# Patient Record
(demographics unavailable — no encounter records)

---

## 2024-10-08 NOTE — REVIEW OF SYSTEMS
[Fever] : no fever [Chills] : no chills [Feeling Tired] : not feeling tired [Eye Pain] : no eye pain [Earache] : no earache [Chest Pain] : no chest pain [Palpitations] : no palpitations [Lower Ext Edema] : no extremity edema [Shortness Of Breath] : no shortness of breath [Cough] : no cough [Abdominal Pain] : no abdominal pain [Vomiting] : no vomiting [Constipation] : no constipation [Diarrhea] : no diarrhea [Melena] : no melena [Limb Swelling] : no limb swelling [Itching] : no itching [Dizziness] : no dizziness [Fainting] : no fainting [Anxiety] : no anxiety [Easy Bleeding] : no tendency for easy bleeding [Easy Bruising] : no tendency for easy bruising

## 2024-10-08 NOTE — HISTORY OF PRESENT ILLNESS
[FreeTextEntry1] : RFR: liver mass on CT(?) Referring from cardiology Dr. Domo Blanco  Curt Garcia is a 76y male with HTN, HLD, PVD AI CAD s/p PCI who is referred by cardiology Dr. Blanco for liver mass on CT angio.  - CT Angio coronary 8/9/2023: no liver lesion mentioned. - No other abd imaging  Today he reports feeling well and no physical complaints. No autoimmune diseases.  [Medical Hx] as above [Surgical Hx] CAD s/p PCI [Social Hx] Alcohol: 1/week, 3-4 glasses of soju on each occasion  Tobacco: Quit at age of 50 illicit drug: Denies  Herb and dietary Supplement: Red ginseng extract [FMH of liver disease] None  [Procedures] EGD: No prior Colonoscopy: No prior Pt declined due to concerning for complication

## 2024-10-08 NOTE — REVIEW OF SYSTEMS
[Fever] : no fever [Chills] : no chills [Feeling Tired] : not feeling tired [Eye Pain] : no eye pain [Earache] : no earache [Chest Pain] : no chest pain [Palpitations] : no palpitations [Lower Ext Edema] : no extremity edema [Shortness Of Breath] : no shortness of breath [Cough] : no cough [Abdominal Pain] : no abdominal pain [Vomiting] : no vomiting [Constipation] : no constipation [Diarrhea] : no diarrhea [Melena] : no melena [Limb Swelling] : no limb swelling [Dizziness] : no dizziness [Itching] : no itching [Fainting] : no fainting [Anxiety] : no anxiety [Easy Bleeding] : no tendency for easy bleeding [Easy Bruising] : no tendency for easy bruising

## 2024-10-08 NOTE — PHYSICAL EXAM
[Scleral Icterus] : No Scleral Icterus [Hepatojugular Reflux] : patient did not have a sustained hepatojugular reflux [Abdominal  Ascites] : no ascites [Spider Angioma] : No spider angioma(s) were observed [Splenomegaly] : no splenomegaly [Liver Palpable ___ Finger Breadths Below Costal Margin] : Liver edge was palpable [unfilled] finger breadths below costal margin [Non-Tender] : non-tender [Asterixis] : no asterixis observed [Jaundice] : No jaundice [Palmar Erythema] : no Palmar Erythema [General Appearance - Alert] : alert [General Appearance - In No Acute Distress] : in no acute distress [Outer Ear] : the ears and nose were normal in appearance [Sclera] : the sclera and conjunctiva were normal [Neck Appearance] : the appearance of the neck was normal [] : no respiratory distress [Murmurs] : no murmurs [Heart Sounds] : normal S1 and S2 [Edema] : there was no peripheral edema [Abdomen Tenderness] : non-tender [Abdomen Hernia] : no hernia was discovered [Abnormal Walk] : normal gait [Skin Color & Pigmentation] : normal skin color and pigmentation [Oriented To Time, Place, And Person] : oriented to person, place, and time [Impaired Insight] : insight and judgment were intact [Affect] : the affect was normal

## 2024-10-08 NOTE — ASSESSMENT
[FreeTextEntry1] :  [Assessment]   76y male with HTN, HLD, PVD AI CAD s/p PCI who is referred by cardiology Dr. Blanco for liver mass on CT angio.  [Plan]  # Liver mass - Education and counseling were provided to the patient. - Discussed at length with the patient that next course of action would depend on results  Labs AFP, , CEA Imaging: US abd RTO in 204 weeks  # Health Maintenance - Screen Hep A/B/C

## 2024-10-08 NOTE — PHYSICAL EXAM
[Scleral Icterus] : No Scleral Icterus [Hepatojugular Reflux] : patient did not have a sustained hepatojugular reflux [Abdominal  Ascites] : no ascites [Spider Angioma] : No spider angioma(s) were observed [Splenomegaly] : no splenomegaly [Liver Palpable ___ Finger Breadths Below Costal Margin] : Liver edge was palpable [unfilled] finger breadths below costal margin [Non-Tender] : non-tender [Asterixis] : no asterixis observed [Jaundice] : No jaundice [Palmar Erythema] : no Palmar Erythema [General Appearance - Alert] : alert [General Appearance - In No Acute Distress] : in no acute distress [Outer Ear] : the ears and nose were normal in appearance [Sclera] : the sclera and conjunctiva were normal [Neck Appearance] : the appearance of the neck was normal [] : no respiratory distress [Heart Sounds] : normal S1 and S2 [Murmurs] : no murmurs [Edema] : there was no peripheral edema [Abdomen Tenderness] : non-tender [Abdomen Hernia] : no hernia was discovered [Abnormal Walk] : normal gait [Skin Color & Pigmentation] : normal skin color and pigmentation [Oriented To Time, Place, And Person] : oriented to person, place, and time [Impaired Insight] : insight and judgment were intact [Affect] : the affect was normal

## 2024-10-08 NOTE — PHYSICAL EXAM
[Scleral Icterus] : No Scleral Icterus [Hepatojugular Reflux] : patient did not have a sustained hepatojugular reflux [Abdominal  Ascites] : no ascites [Spider Angioma] : No spider angioma(s) were observed [Splenomegaly] : no splenomegaly [Liver Palpable ___ Finger Breadths Below Costal Margin] : Liver edge was palpable [unfilled] finger breadths below costal margin [Non-Tender] : non-tender [Asterixis] : no asterixis observed [Jaundice] : No jaundice [Palmar Erythema] : no Palmar Erythema [General Appearance - Alert] : alert [General Appearance - In No Acute Distress] : in no acute distress [Outer Ear] : the ears and nose were normal in appearance [Sclera] : the sclera and conjunctiva were normal [Neck Appearance] : the appearance of the neck was normal [] : no respiratory distress [Murmurs] : no murmurs [Heart Sounds] : normal S1 and S2 [Edema] : there was no peripheral edema [Abdomen Tenderness] : non-tender [Abdomen Hernia] : no hernia was discovered [Abnormal Walk] : normal gait [Oriented To Time, Place, And Person] : oriented to person, place, and time [Skin Color & Pigmentation] : normal skin color and pigmentation [Impaired Insight] : insight and judgment were intact [Affect] : the affect was normal

## 2024-11-07 NOTE — HISTORY OF PRESENT ILLNESS
[FreeTextEntry1] : Referring from cardiology Dr. Domo Blanco  Curt Garcia is a 76y male with HTN, HLD, PVD AI CAD s/p PCI who is referred by cardiology Dr. Blanco for liver mass on CT Angio.  - Today we got the report from Dr. Blanco's office. *CT Angio aorta 8/2/23 showed 7mm lateral left lobe arterial phase enhancing liver lesion. - CT Angio coronary 8/9/2023: no liver lesion mentioned.  Today pt denies fever, chills, bleeding, worsening tiredness, eating problems, weight loss, unexplained lump, change in urine color/stool color or abd pain.   [Medical Hx] as above [Surgical Hx] CAD s/p PCI [Social Hx] Alcohol: 1/week, 3-4 glasses of soju on each occasion Tobacco: Quit at age of 50 illicit drug: Denies Herb and dietary Supplement: Red ginseng extract [FMH of liver disease] None  [Labs] 10/7/24 Ca19-9 13 CEA 3 AFP 4.8 WBC 7.49 Hb 15 MCV 96.1  Na/K 141/4.2 Cr 0.98 AST/ALT 19/25 ALP 86 GGT 25  [Procedures] EGD: No prior Colonoscopy: No prior - declined the procedures due to concerns about potential complications.

## 2024-11-07 NOTE — PHYSICAL EXAM
[Scleral Icterus] : No Scleral Icterus [Hepatojugular Reflux] : patient did not have a sustained hepatojugular reflux [Spider Angioma] : No spider angioma(s) were observed [Abdominal  Ascites] : no ascites [Splenomegaly] : no splenomegaly [Liver Palpable ___ Finger Breadths Below Costal Margin] : Liver edge was palpable [unfilled] finger breadths below costal margin [Non-Tender] : non-tender [Asterixis] : no asterixis observed [Jaundice] : No jaundice [Palmar Erythema] : no Palmar Erythema [General Appearance - Alert] : alert [General Appearance - In No Acute Distress] : in no acute distress [Sclera] : the sclera and conjunctiva were normal [Outer Ear] : the ears and nose were normal in appearance [Neck Appearance] : the appearance of the neck was normal [] : no respiratory distress [Heart Sounds] : normal S1 and S2 [Murmurs] : no murmurs [Edema] : there was no peripheral edema [Abdomen Tenderness] : non-tender [Abdomen Hernia] : no hernia was discovered [Abnormal Walk] : normal gait [Skin Color & Pigmentation] : normal skin color and pigmentation [Oriented To Time, Place, And Person] : oriented to person, place, and time [Impaired Insight] : insight and judgment were intact [Affect] : the affect was normal

## 2024-11-07 NOTE — ASSESSMENT
[FreeTextEntry1] :  [Assessment]  76y male with HTN, HLD, PVD AI CAD s/p PCI. Here for incidental finding of liver lesion.  CT Angio aorta 8/2/23 showed 7mm lateral left lobe arterial phase enhancing liver lesion.  [Plan]  # 7mm lateral left lobe arterial phase enhancing liver lesion on CT angio aorta 8/2/23 - Education and counseling were provided to the patient. - Discussed at length with the patient that next course of action would depend on results  # No prior EGD/Colonoscopy The patient has no prior EGD or colonoscopy and initially declined the procedures due to concerns about potential complications. I rediscussed about the risks and benefits with him, and he is considering the option of proceeding with the scopes.  # Health Maintenance Immune to Hep A HBV core Ab Reactive, sAb 109, sAg- Immune to Hep B due to natural infection HCV Ab NR   AFP, , CEA are wnl Imaging: MRI w/wo EOVIST to r/o HCC Pt will call out office 2 weeks after the MRI RTO TBD based on MRI results